# Patient Record
Sex: MALE | Race: WHITE
[De-identification: names, ages, dates, MRNs, and addresses within clinical notes are randomized per-mention and may not be internally consistent; named-entity substitution may affect disease eponyms.]

---

## 2019-09-30 ENCOUNTER — HOSPITAL ENCOUNTER (EMERGENCY)
Dept: HOSPITAL 75 - ER | Age: 19
Discharge: HOME | End: 2019-09-30
Payer: SELF-PAY

## 2019-09-30 VITALS — BODY MASS INDEX: 19.49 KG/M2 | HEIGHT: 66.02 IN | WEIGHT: 121.25 LBS

## 2019-09-30 VITALS — SYSTOLIC BLOOD PRESSURE: 101 MMHG | DIASTOLIC BLOOD PRESSURE: 86 MMHG

## 2019-09-30 DIAGNOSIS — S62.336A: Primary | ICD-10-CM
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
## 2019-09-30 DIAGNOSIS — Z79.52: ICD-10-CM

## 2019-09-30 PROCEDURE — 73130 X-RAY EXAM OF HAND: CPT

## 2019-09-30 NOTE — DIAGNOSTIC IMAGING REPORT
INDICATION: Injury.



FINDINGS: There is extra-articular angulated boxer's type

fracture distal neck fifth metacarpal. Benign sclerotic foci in

the proximal middle phalanges of the second finger noted. No

other injury.



IMPRESSION: Mild angulation of an extra-articular fifth

metacarpal boxer's fracture. No other significant finding.



Dictated by: 



  Dictated on workstation # [id number]
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
